# Patient Record
Sex: FEMALE | Race: WHITE
[De-identification: names, ages, dates, MRNs, and addresses within clinical notes are randomized per-mention and may not be internally consistent; named-entity substitution may affect disease eponyms.]

---

## 2018-12-14 ENCOUNTER — HOSPITAL ENCOUNTER (OUTPATIENT)
Dept: HOSPITAL 76 - DI | Age: 58
Discharge: HOME | End: 2018-12-14
Attending: FAMILY MEDICINE
Payer: MEDICARE

## 2018-12-14 DIAGNOSIS — R06.02: Primary | ICD-10-CM

## 2018-12-14 DIAGNOSIS — R06.00: ICD-10-CM

## 2018-12-14 DIAGNOSIS — R05: ICD-10-CM

## 2018-12-14 PROCEDURE — 71046 X-RAY EXAM CHEST 2 VIEWS: CPT

## 2018-12-18 NOTE — XRAY REPORT
Reason:  SOB/COLEMAN/COUGH X>1 MONTH

Procedure Date:  12/14/2018   

Accession Number:  [id number] / [id number]                    

Procedure:  XR  - Chest 2 View X-Ray CPT Code:  50584

 

FULL RESULT:

 

 

EXAM:

CHEST RADIOGRAPHY

 

EXAM DATE: 12/14/2018 12:24 PM.

 

CLINICAL HISTORY: Shortness of breath, dyspnea on exertion, and cough x1 

month.

 

COMPARISON: None.

 

TECHNIQUE: 2 views.

 

FINDINGS:

Lungs/Pleura: No focal opacities evident. No pleural effusion. No 

pneumothorax. Normal volumes.

 

Mediastinum: Heart and mediastinal contours are unremarkable.

 

Other: None.

IMPRESSION: Normal 2-view chest radiography.

 

[facility]

## 2021-03-22 ENCOUNTER — HOSPITAL ENCOUNTER (OUTPATIENT)
Dept: HOSPITAL 76 - DI | Age: 61
Discharge: HOME | End: 2021-03-22
Attending: FAMILY MEDICINE
Payer: MEDICARE

## 2021-03-22 DIAGNOSIS — R79.89: ICD-10-CM

## 2021-03-22 DIAGNOSIS — N18.9: Primary | ICD-10-CM

## 2021-03-22 NOTE — ULTRASOUND REPORT
PROCEDURE:  Retroperitoneal

 

INDICATIONS:  New chronic kidney disease; elevated adrenal hormones

 

TECHNIQUE:  

Real-time scanning was performed of the retroperitoneal organs, with image documentation.  

 

COMPARISON:  None.

 

FINDINGS:     

 

Both kidneys are normal in size, measuring approximately 10.6 cm in long axis. Cortical thickness is 
preserved bilaterally. Cortical echogenicity is subjectively heterogeneously and mildly increased raul
aterally. No focal mass. No shadowing calculus or hydronephrosis. The urinary bladder is decompressed
 and not well evaluated but grossly unremarkable.

 

IMPRESSION:  

Questionably increased renal parenchymal echogenicity. This could indicate chronic medical renal dise
ase in the appropriate clinical setting.

 

No findings of renal atrophy or hydronephrosis.

 

The adrenal glands are not visualized.

 

Reviewed by: Adalid Rodrigez MD on 3/22/2021 11:28 AM PDT

Approved by: Adalid Rodrigez MD on 3/22/2021 11:28 AM PDT

 

 

Station ID:  [id number]

## 2021-04-20 ENCOUNTER — HOSPITAL ENCOUNTER (OUTPATIENT)
Dept: HOSPITAL 76 - DI | Age: 61
Discharge: HOME | End: 2021-04-20
Attending: FAMILY MEDICINE
Payer: MEDICARE

## 2021-04-20 DIAGNOSIS — R07.81: Primary | ICD-10-CM

## 2021-04-21 NOTE — XRAY REPORT
PROCEDURE:  Chest 2 View X-Ray

 

INDICATIONS:  PLEURITIC CP, RIB TTP, R CHEST WALL

 

TECHNIQUE:  2 view(s) of the chest.  

 

COMPARISON:  None.

 

FINDINGS:  

 

Surgical changes and devices:  None.  

 

Lungs and pleura:  No pleural effusions or pneumothorax.  Lungs are clear.  

 

Mediastinum:  Mediastinal contours are normal.  Heart size is normal.  

 

Bones and chest wall:  No suspicious bony abnormalities.  Soft tissues appear unremarkable.  

 

IMPRESSION:  Two-view chest, no acute disease, source of pain is not seen.

 

Reviewed by: Ray Herzog MD on 4/21/2021 4:49 PM PDT

Approved by: Ray Herzog MD on 4/21/2021 4:49 PM PDT

 

 

Station ID:  [id number]

## 2022-03-23 ENCOUNTER — HOSPITAL ENCOUNTER (OUTPATIENT)
Dept: HOSPITAL 76 - DI.S | Age: 62
Discharge: HOME | End: 2022-03-23
Attending: FAMILY MEDICINE
Payer: MEDICARE

## 2022-03-23 DIAGNOSIS — Z12.31: Primary | ICD-10-CM

## 2022-03-23 DIAGNOSIS — Z80.3: ICD-10-CM

## 2022-03-28 ENCOUNTER — HOSPITAL ENCOUNTER (OUTPATIENT)
Dept: HOSPITAL 76 - DI | Age: 62
Discharge: HOME | End: 2022-03-28
Attending: FAMILY MEDICINE
Payer: MEDICARE

## 2022-03-28 DIAGNOSIS — M85.89: Primary | ICD-10-CM

## 2022-03-28 DIAGNOSIS — Z78.0: ICD-10-CM

## 2022-03-28 NOTE — DEXA REPORT
PROCEDURE: Dexa Spine and/or Hip

 

INDICATIONS: POSTMENOPAUSAL

 

TECHNIQUE: Dual energy x-ray absorptiometry (DXA) was performed on a Digium System.  Regions measur
ed are the AP Spine, femoral neck, and if needed forearm.

 

COMPARISON: None.

  

FINDINGS:

 

Lumbar Spine: 

Bone Mineral Density   0.979 g/cm/cm,T score  -1.7, osteopenia.

 

Left Hip:

Bone Mineral Density  0.808 g/cm/cm,T score -1.6, osteopenia.

 

Left Femoral Neck:

Bone Mineral Density  0.779 g/cm/cm, T score -1.9, osteopenia.

 

(T score greater or equal to -1.0: NORMAL)

(T score from -1.1 to -2.4: OSTEOPENIA)

(T score less than or equal to -2.5 to: OSTEOPOROSIS)

 

Impression: 

Decreased bone mineral density within the osteopenic range.

 

Patients with diagnosis of osteoporosis or osteopenia should have regular bone mineral density assess
ment.  For those eligible for Medicare, routine testing is allowed once every 2 years.  Testing frequ
ency can be increased for patients who have rapidly progressing disease or for those who are receivin
g medical therapy to restore bone mass.

 

Reviewed by: Zaid Harrell MD on 3/28/2022 3:54 PM PDT

Approved by: Zaid Harrell MD on 3/28/2022 3:54 PM PDT

 

 

Station ID:  [id number]

## 2022-03-30 NOTE — MAMMOGRAPHY REPORT
BILATERAL DIGITAL SCREENING MAMMOGRAM 3D/2D WITH EXAGGERATED CC: 3/23/2022

 

CLINICAL: Routine screening. Family history of breast cancer.  

 

No prior exams were available for comparison.  The tissue of both breasts is heterogeneously dense. T
his may lower the sensitivity of mammography.  

 

 

No significant masses, calcifications, or other findings are seen in either breast.  

 

IMPRESSION: NEGATIVE

There is no mammographic evidence of malignancy. A 1 year screening mammogram is recommended.  

 

 

This exam was interpreted at Station ID: [id number].  

 

NOTE: For mammograms, a report in lay terms will be sent to the patient. Approximately 15% of breast 
malignancies will not be visualized mammographically. In the management of a palpable breast mass, a 
negative mammogram must not discourage biopsy of a clinically suspicious lesion.

 

Electronically Signed By: Aris Meza M.D.          

[facility] [place]:3/29/2022 14:48:05  

 

 

 

ACR BI-RADS Category 1: Negative 3341F

PARENCHYMAL PATTERN: (D) - The breast(s) demonstrate(s) heterogeneously dense fibroglandular taylor montenegro.

BI-RADS CATEGORY: (1) - 1

RECOMMENDATION: (ANNUAL)  - Recommend routine annual screening mammography.

[id number]

1 year screening

LATERALITY: (B)

## 2024-01-19 ENCOUNTER — HOSPITAL ENCOUNTER (EMERGENCY)
Dept: HOSPITAL 76 - ED | Age: 64
Discharge: HOME | End: 2024-01-19
Payer: MEDICARE

## 2024-01-19 VITALS — OXYGEN SATURATION: 100 % | SYSTOLIC BLOOD PRESSURE: 122 MMHG | DIASTOLIC BLOOD PRESSURE: 80 MMHG

## 2024-01-19 DIAGNOSIS — H53.8: Primary | ICD-10-CM

## 2024-01-19 LAB
ANION GAP SERPL CALCULATED.4IONS-SCNC: 8 MMOL/L (ref 6–13)
BASOPHILS NFR BLD AUTO: 0.1 10^3/UL (ref 0–0.1)
BASOPHILS NFR BLD AUTO: 0.6 %
BUN SERPL-MCNC: 20 MG/DL (ref 6–20)
CALCIUM UR-MCNC: 9.5 MG/DL (ref 8.5–10.3)
CHLORIDE SERPL-SCNC: 100 MMOL/L (ref 101–111)
CO2 SERPL-SCNC: 27 MMOL/L (ref 21–32)
CREAT SERPLBLD-SCNC: 0.9 MG/DL (ref 0.6–1.3)
EOSINOPHIL # BLD AUTO: 0.2 10^3/UL (ref 0–0.7)
EOSINOPHIL NFR BLD AUTO: 2 %
ERYTHROCYTE [DISTWIDTH] IN BLOOD BY AUTOMATED COUNT: 12.9 % (ref 12–15)
GFRSERPLBLD MDRD-ARVRAT: 63 ML/MIN/{1.73_M2} (ref 89–?)
GLUCOSE SERPL-MCNC: 96 MG/DL (ref 74–104)
HCT VFR BLD AUTO: 37.3 % (ref 37–47)
HGB UR QL STRIP: 12.5 G/DL (ref 12–16)
LYMPHOCYTES # SPEC AUTO: 2.5 10^3/UL (ref 1.5–3.5)
LYMPHOCYTES NFR BLD AUTO: 31.9 %
MCH RBC QN AUTO: 32.2 PG (ref 27–31)
MCHC RBC AUTO-ENTMCNC: 33.5 G/DL (ref 32–36)
MCV RBC AUTO: 96.1 FL (ref 81–99)
MONOCYTES # BLD AUTO: 0.6 10^3/UL (ref 0–1)
MONOCYTES NFR BLD AUTO: 7.6 %
NEUTROPHILS # BLD AUTO: 4.5 10^3/UL (ref 1.5–6.6)
NEUTROPHILS # SNV AUTO: 7.8 X10^3/UL (ref 4.8–10.8)
NEUTROPHILS NFR BLD AUTO: 57.6 %
NRBC # BLD AUTO: 0 /100WBC
NRBC # BLD AUTO: 0 X10^3/UL
PDW BLD AUTO: 9.5 FL (ref 7.9–10.8)
PLATELET # BLD: 320 10^3/UL (ref 130–450)
POTASSIUM SERPL-SCNC: 3.8 MMOL/L (ref 3.5–4.5)
RBC MAR: 3.88 10^6/UL (ref 4.2–5.4)
SODIUM SERPLBLD-SCNC: 135 MMOL/L (ref 135–145)

## 2024-01-19 PROCEDURE — 36415 COLL VENOUS BLD VENIPUNCTURE: CPT

## 2024-01-19 PROCEDURE — 70480 CT ORBIT/EAR/FOSSA W/O DYE: CPT

## 2024-01-19 PROCEDURE — 85025 COMPLETE CBC W/AUTO DIFF WBC: CPT

## 2024-01-19 PROCEDURE — 80048 BASIC METABOLIC PNL TOTAL CA: CPT

## 2024-01-19 PROCEDURE — 99283 EMERGENCY DEPT VISIT LOW MDM: CPT

## 2024-01-19 PROCEDURE — 70450 CT HEAD/BRAIN W/O DYE: CPT

## 2024-01-19 PROCEDURE — 70543 MRI ORBT/FAC/NCK W/O &W/DYE: CPT

## 2024-01-19 PROCEDURE — 99284 EMERGENCY DEPT VISIT MOD MDM: CPT

## 2024-01-19 NOTE — MRI REPORT
PROCEDURE:  ORBITS/FACE W/WO

 

INDICATIONS:  papilledema R eye

 

CONTRAST: CLARISCAN 12.6 ML 

 

TECHNIQUE:  

Noncontrast sagittal T1 spin echo, axial FLAIR, axial gradient echo, axial diffusion and ADC acquired
 through the brain.  Coronal STIR, thin-slice axial T1 spin echo through the orbits.  After the admin
istration of contrast, thin slice axial and coronal T1 spin echo with fat saturation through the orbi
ts, axial T1 spin echo with fat saturation through the brain.  

 

COMPARISON:  None.

 

FINDINGS:  

Image quality:  Excellent.  

 

Orbits:  Globes are symmetrical.  The optic nerves are normal in size, without abnormal signal or enh
ancement.  No retrobulbar masses or fat abnormalities.  The extra-ocular muscles are normal and symme
tric in appearance.  Lacrimal glands are normal.  Optic chiasm is normal.  Periorbital soft tissues a
ppear normal.  

 

CSF spaces:  Ventricles are normal in size and shape.  Basal cisterns are patent.  No extra-axial flu
id collections.  

 

Brain:  No intracranial bleeds or mass effects.  No abnormal intracranial enhancement.  Gray-white ma
tter interface is intact.  Diffusion weighted images demonstrate no acute ischemic insults.  Pituitar
y gland appears normal, without sellar or suprasellar masses.  Brainstem appears normal.  Normal intr
avascular flow voids are present.  

 

Skull and face:  Calvarial marrow is normal in signal.  

 

Sinuses:  Sinuses and mastoids are clear.  

 

IMPRESSION:  

Normal MRI of the orbits.

 

No acute intracranial process. 

 

Reviewed by: Rashawn Lay MD on 1/19/2024 7:30 PM PST

Approved by: Rashawn Lay MD on 1/19/2024 7:30 PM PST

 

 

Station ID:  [id number]

## 2024-01-19 NOTE — ED PHYSICIAN DOCUMENTATION
PD HPI OPHTHO





- Stated complaint


Stated Complaint: RT EYE INJ





- Chief complaint


Chief Complaint: Heent





- History obtained from


History obtained from: Patient





- History of Present Illness


Timing - onset: How many days ago (2)


Timing - duration: Days (2)


Timing - details: Gradual onset


Pain level max: 0


Pain level now: 0


Location: Right


Associated symptoms: No: Swelling, Tearing, Discharge, Matting, FB sensation, 

Photophobia, Double vision, Headache





- Additional information


Additional information: 





Patient is a 63-year-old female sent in by her ophthalmologist.  She states that

she was told she has papilledema of the right eye.  She has had blurred vision 

for the past 2 days.  She states it is like the bottom half of her vision is 

smeared with Vaseline.  The top of the vision in the right eye is normal.  She 

saw an ophthalmologist today who sent her here for a CT/MRI of the orbits.  No 

falls.  No trauma.  No headaches.  She states that she did have some redness 

around the right periorbital area a few days ago, was started on Polytrim 

ophthalmic.  She was also started on Keflex.  The redness around the eye has 

resolved.  There is no redness of the sclera.  Had an otherwise normal 

ophthalmological exam today.





Review of Systems


Constitutional: denies: Fever, Chills


GI: denies: Vomiting, Diarrhea


Skin: denies: Rash


Musculoskeletal: denies: Neck pain, Back pain


Neurologic: denies: Focal weakness, Numbness, Confused, Headache, Head injury, 

LOC





PD PAST MEDICAL HISTORY





- Past Medical History


Past Medical History: Yes


Psych: Depression, Anxiety, Post traumatic stress disorder





- Past Surgical History


Past Surgical History: Yes


General: Appendectomy


/GYN: Tubal ligation





- Present Medications


Home Medications: 


                                Ambulatory Orders











 Medication  Instructions  Recorded  Confirmed


 


Polymyxin B/Trimeth Ophth Drop 1 drops EACHEYE DAILY 01/19/24 01/19/24





[Polytrim Ophth Drops]   














- Allergies


Allergies/Adverse Reactions: 


                                    Allergies











Allergy/AdvReac Type Severity Reaction Status Date / Time


 


chloramphenicol Allergy  Unknown Verified 01/19/24 16:51














- Social History


Does the pt smoke?: No


Smoking Status: Never smoker





PD ED PE NORMAL





- Vitals


Vital signs reviewed: Yes





- General


General: Alert and oriented X 3, No acute distress





- HEENT


HEENT: PERRL, EOMI, Moist mucous membranes, Other (No pain with extraocular 

movement.  Normal periorbital exam)





- Neck


Neck: Supple, no meningeal sign





- Cardiac


Cardiac: RRR, Strong equal pulses





- Respiratory


Respiratory: No respiratory distress, Clear bilaterally





- Abdomen


Abdomen: Soft, Non tender, Non distended





- Derm


Derm: Warm and dry





- Neuro


Neuro: Alert and oriented X 3, CNs 2-12 intact, No motor deficit, No sensory 

deficit, Normal speech


Eye Opening: Spontaneous


Motor: Obeys Commands


Verbal: Oriented


GCS Score: 15





- Psych


Psych: Normal mood, Normal affect





Results





- Vitals


Vitals: 


                               Vital Signs - 24 hr











  01/19/24 01/19/24





  16:46 20:20


 


Temperature 36.7 C 36.7 C


 


Heart Rate 78 72


 


Respiratory 18 18





Rate  


 


Blood Pressure 124/90 H 122/80


 


O2 Saturation 99 100








                                     Oxygen











O2 Source                      Room air

















- Labs


Labs: 


                                Laboratory Tests











  01/19/24 01/19/24





  18:31 18:31


 


WBC  7.8 


 


RBC  3.88 L 


 


Hgb  12.5 


 


Hct  37.3 


 


MCV  96.1 


 


MCH  32.2 H 


 


MCHC  33.5 


 


RDW  12.9 


 


Plt Count  320 


 


MPV  9.5 


 


Neut # (Auto)  4.5 


 


Lymph # (Auto)  2.5 


 


Mono # (Auto)  0.6 


 


Eos # (Auto)  0.2 


 


Baso # (Auto)  0.1 


 


Absolute Nucleated RBC  0.00 


 


Nucleated RBC %  0.0 


 


Sodium   135


 


Potassium   3.8


 


Chloride   100 L


 


Carbon Dioxide   27


 


Anion Gap   8.0


 


BUN   20


 


Creatinine   0.9


 


Estimated GFR (MDRD)   63 L


 


Glucose   96


 


Calcium   9.5














- Rads (name of study)


  ** orbit CT


Relevant Findings:: Final report received, See rad report





  ** orbit MRI


Relevant Findings:: Final report received, See rad report





  ** head CT


Relevant Findings:: Final report received, See rad report





PD Medical Decision Making





- ED course


Complexity details: reviewed results, re-evaluated patient, considered 

differential, d/w patient


ED course: 





63-year-old female was sent here for MRI of the orbits and CT of the orbits due 

to findings of possible papilledema on outpatient examination today with 

ophthalmology.  No report was given by the ophthalmologist.  Patient states that

she was supposed to get the testing and then follow-up with him next week. No 

acute findings on CT scan of the head or orbits.  No acute findings on MRI of 

the orbits and head.  No evidence of inflammation or edema.  No evidence of 

tumors or masses.  Ventricles are normal in size.  She has an appointment to 

follow-up with her ophthalmologist regarding the results of the studies.  Normal

neurological exam.  Normal gait.  No focal neurological deficits.  Patient 

counseled regarding signs and symptoms for which I believe and urgent re-

evaluation would be necessary. Patient with good understanding of and agreement 

to plan and is comfortable going home at this time





This document was made in part using voice recognition software. While efforts 

are made to proofread this document, sound alike and grammatical errors may 

occur.





Departure





- Departure


Disposition: 01 Home, Self Care


Clinical Impression: 


 Vision changes





Condition: Good


Instructions:  Vision Probs


Follow-Up: 


NOLAN STEPHENS JR, MD [Physician No Access] - 


Comments: 


Your CT scan does not show any acute abnormalities today.  Your orbit MRI with 

and without contrast does not show any abnormalities either.  Please follow-up 

with your ophthalmologist on Tuesday as scheduled.  Please return if you worsen.








PROCEDURE: ORBITS/FACE W/WO 





INDICATIONS: papilledema R eye 





CONTRAST: CLARISCAN 12.6 ML 





TECHNIQUE: 


Noncontrast sagittal T1 spin echo, axial FLAIR, axial gradient echo, axial 

diffusion and ADC 


acquired through the brain. Coronal STIR, thin-slice axial T1 spin echo through 

the orbits. After 


the administration of contrast, thin slice axial and coronal T1 spin echo with 

fat saturation 


through the orbits, axial T1 spin echo with fat saturation through the brain. 





COMPARISON: None. 





FINDINGS: 


Image quality: Excellent. 





Orbits: Globes are symmetrical. The optic nerves are normal in size, without 

abnormal signal or 


enhancement. No retrobulbar masses or fat abnormalities. The extra-ocular 

muscles are normal and 


symmetric in appearance. Lacrimal glands are normal. Optic chiasm is normal. 

Periorbital soft 


tissues appear normal. 





CSF spaces: Ventricles are normal in size and shape. Basal cisterns are patent. 

No extra-axial 


fluid collections. 





Brain: No intracranial bleeds or mass effects. No abnormal intracranial 

enhancement. Gray-white 


matter interface is intact. Diffusion weighted images demonstrate no acute 

ischemic insults. 


Pituitary gland appears normal, without sellar or suprasellar masses. Brainstem 

appears normal. 


Normal intravascular flow voids are present. 





Skull and face: Calvarial marrow is normal in signal. 





Sinuses: Sinuses and mastoids are clear. 





IMPRESSION: 


Normal MRI of the orbits. 





No acute intracranial process. 





Reviewed by: Rashawn Lay MD on 1/19/2024 7:30 PM PST 


Approved by: Rashawn Lay MD on 1/19/2024 7:30 PM PST 








Station ID: [id number] 














Report Electronically Signed by Rashawn Lay MD 01/19/24 1928 01/19/24 1930 





cc: Rita Mckee MD; Kuldeep Wyman MD 





*****ADDENDUM***** 





******** ORIGINAL REPORT ******** 








PROCEDURE: ORBITS/FACE W/WO 





INDICATIONS: papilledema R eye 





CONTRAST: CLARISCAN 12.6 ML 





TECHNIQUE: 


Noncontrast sagittal T1 spin echo, axial FLAIR, axial gradient echo, axial 

diffusion and ADC 


acquired through the brain. Coronal STIR, thin-slice axial T1 spin echo through 

the orbits. After 


the administration of contrast, thin slice axial and coronal T1 spin echo with 

fat saturation 


through the orbits, axial T1 spin echo with fat saturation through the brain. 





COMPARISON: None. 





FINDINGS: 


Image quality: Excellent. 





Orbits: Globes are symmetrical. The optic nerves are normal in size, without 

abnormal signal or 


enhancement. No retrobulbar masses or fat abnormalities. The extra-ocular 

muscles are normal and 


symmetric in appearance. Lacrimal glands are normal. Optic chiasm is normal. 

Periorbital soft 


tissues appear normal. 





CSF spaces: Ventricles are normal in size and shape. Basal cisterns are patent. 

No extra-axial 


fluid collections. 





Brain: No intracranial bleeds or mass effects. No abnormal intracranial 

enhancement. Gray-white 


matter interface is intact. Diffusion weighted images demonstrate no acute 

ischemic insults. 


Pituitary gland appears normal, without sellar or suprasellar masses. Brainstem 

appears normal. 


Normal intravascular flow voids are present. 





Skull and face: Calvarial marrow is normal in signal. 





Sinuses: Sinuses and mastoids are clear. 





IMPRESSION: 


Normal MRI of the orbits. 





No acute intracranial process. 





Reviewed by: Rashawn Lay MD on 1/19/2024 7:30 PM PST 


Approved by: Rashawn Lay MD on 1/19/2024 7:30 PM PST 





******** ADDENDUM #1 ******** 





Addendum: 





Additional images submitted for review. Findings and impression remain 

unchanged. Normal, symmetric 


enhancement of the optic nerve without enlargement. 





Reviewed by: Rashawn Lay MD on 1/19/2024 7:45 PM PST 


Approved by: Rashawn Lay MD on 1/19/2024 7:45 PM PST 








Station ID: [id number] 





Addendum : [facility] 


Addendum Reading Radiologist: Rashawn Lay MD 


Addendum Releasing Radiologist: Rashawn Lay MD 


Addendum Released Date Time: 01/19/24 1945 





Report Electronically Signed by Rashawn Lay MD at 01/19/24 1945 





PROCEDURE: Head WO 





INDICATIONS: R papilledema 





TECHNIQUE: 


Noncontrast 4.5 mm thick angled axial sections acquired from the foramen magnum 

to the vertex. For 


radiation dose reduction, the following was used: automated exposure control, 

adjustment of mA 


and/or kV according to patient size. 





COMPARISON: None. 





FINDINGS: 


Image quality: Excellent. 





CSF spaces: Basal cisterns are patent. No extra-axial fluid collections. 

Ventricles are normal in


size and shape. 





Brain: No midline shift. No intracranial masses or hemorrhage. Gray-white matter

interface is 


normal. 





Skull and face: Calvarium and visualized facial bones are intact, without 

suspicious lesions. 





Sinuses: Visualized sinuses and mastoids are clear. 








IMPRESSION: 


No acute intracranial pathology. 








PROCEDURE: Orbits WO 





INDICATIONS: R papilledema 





TECHNIQUE: 


Noncontrast 2.0 mm axial images acquired through the orbits. For radiation dose 

reduction, the 


following was used: automated exposure control, adjustment of mA and/or kV 

according to patient 


size. 








COMPARISON: None 





FINDINGS: 


Image quality: Excellent. 





Orbits: Globes are symmetrical. No metallic foreign bodies. The optic nerves are

normal in size. 


No retrobulbar masses or fat abnormalities. The extra-ocular muscles are normal 

and symmetrical in 


appearance. Lacrimal glands are normal in size. Optic chiasm is normal. 





Intracranial: Visualized portions of the cerebral hemispheres, brainstem, and 

spinal cord are 


normal. 





Bones and sinuses: Visualized calvarium and facial bones appear intact. 

Visualized sinuses and 


mastoids are clear. 





IMPRESSION: 


Normal CT of the orbits. 








Forms:  PCP List


Discharge Date/Time: 01/19/24 20:20

## 2024-01-19 NOTE — CT REPORT
PROCEDURE:  Orbits WO

 

INDICATIONS:  R papilledema

 

TECHNIQUE:  

Noncontrast 2.0 mm axial images acquired through the orbits.  For radiation dose reduction, the follo
wing was used:  automated exposure control, adjustment of mA and/or kV according to patient size.

 

 

COMPARISON:  None

 

FINDINGS:  

Image quality:  Excellent.  

 

Orbits:  Globes are symmetrical.  No metallic foreign bodies.  The optic nerves are normal in size.  
No retrobulbar masses or fat abnormalities.  The extra-ocular muscles are normal and symmetrical in a
ppearance.  Lacrimal glands are normal in size.  Optic chiasm is normal.  

 

Intracranial:  Visualized portions of the cerebral hemispheres, brainstem, and spinal cord are normal
.  

 

Bones and sinuses:  Visualized calvarium and facial bones appear intact.  Visualized sinuses and mast
oids are clear.  

 

IMPRESSION:  

Normal CT of the orbits.

 

 

 

Reviewed by: Rashawn Lay MD on 1/19/2024 6:40 PM PST

Approved by: Rashawn Lay MD on 1/19/2024 6:40 PM [facility]

 

 

Station ID:  [id number]

## 2024-01-19 NOTE — CT REPORT
PROCEDURE:  Head WO

 

INDICATIONS:  R papilledema

 

TECHNIQUE:  

Noncontrast 4.5 mm thick angled axial sections acquired from the foramen magnum to the vertex.  For r
adiation dose reduction, the following was used:  automated exposure control, adjustment of mA and/or
 kV according to patient size.

 

COMPARISON:  None.

 

FINDINGS:  

Image quality:  Excellent.  

 

CSF spaces:  Basal cisterns are patent.  No extra-axial fluid collections.  Ventricles are normal in 
size and shape.  

 

Brain:  No midline shift.  No intracranial masses or hemorrhage.  Gray-white matter interface is norm
al.  

 

Skull and face:  Calvarium and visualized facial bones are intact, without suspicious lesions.  

 

Sinuses:  Visualized sinuses and mastoids are clear.  

 

 

IMPRESSION:  

No acute intracranial pathology.

 

 

 

Reviewed by: Rashawn Lay MD on 1/19/2024 6:39 PM PST

Approved by: Rashawn Lay MD on 1/19/2024 6:39 PM [facility]

 

 

Station ID:  [id number]

## 2024-01-23 ENCOUNTER — HOSPITAL ENCOUNTER (EMERGENCY)
Dept: HOSPITAL 76 - ED | Age: 64
Discharge: HOME | End: 2024-01-23
Payer: MEDICARE

## 2024-01-23 VITALS — OXYGEN SATURATION: 100 % | DIASTOLIC BLOOD PRESSURE: 79 MMHG | SYSTOLIC BLOOD PRESSURE: 164 MMHG

## 2024-01-23 DIAGNOSIS — H53.9: Primary | ICD-10-CM

## 2024-01-23 PROCEDURE — 99283 EMERGENCY DEPT VISIT LOW MDM: CPT

## 2024-01-23 PROCEDURE — 86140 C-REACTIVE PROTEIN: CPT

## 2024-01-23 PROCEDURE — 85651 RBC SED RATE NONAUTOMATED: CPT

## 2024-01-23 PROCEDURE — 36415 COLL VENOUS BLD VENIPUNCTURE: CPT

## 2024-01-23 NOTE — ED PHYSICIAN DOCUMENTATION
History of Present Illness





- Stated complaint


Stated Complaint: RT EYE VISION ISSUE





- Chief complaint


Chief Complaint: Heent





- History obtained from


History obtained from: Patient





- History of Present Illness


Pain level max: 0


Pain level now: 0





- Additonal information


Additional information: 





63-year-old female with vision changes for the past 2 weeks.  She was seen here 

last week with an MRI and CT scan of her head and orbits.  Saw her 

ophthalmologist again today who sent her back for a sed rate and CRP.  Patient 

has no other acute issues.  No headache.  No fevers.  No chills.





Review of Systems


Constitutional: denies: Fever, Chills


GI: denies: Vomiting





PD PAST MEDICAL HISTORY





- Past Medical History


Past Medical History: Yes


Cardiovascular: None


Respiratory: None


Neuro: None


Endocrine/Autoimmune: None


GI: None


GYN: None


: None


HEENT: None


Psych: Depression, Anxiety, Post traumatic stress disorder


Musculoskeletal: None


Derm: None





- Past Surgical History


Past Surgical History: Yes


General: Appendectomy


/GYN: Tubal ligation





- Present Medications


Home Medications: 


                                Ambulatory Orders











 Medication  Instructions  Recorded  Confirmed


 


predniSONE [Deltasone] 30 mg PO DAILY 01/23/24 01/23/24














- Allergies


Allergies/Adverse Reactions: 


                                    Allergies











Allergy/AdvReac Type Severity Reaction Status Date / Time


 


chloramphenicol Allergy  Unknown Verified 01/23/24 18:13














- Social History


Does the pt smoke?: No


Smoking Status: Never smoker


Does the pt drink ETOH?: Yes


ETOH Use: Beer


Does the pt have substance abuse?: Yes


Substance Use and Type: Marijuana





- Immunizations


Immunizations are current?: Yes





- POLST


Patient has POLST: No





PD ED PE NORMAL





- Vitals


Vital signs reviewed: Yes





- General


General: Alert and oriented X 3, No acute distress





- HEENT


HEENT: PERRL, EOMI, Moist mucous membranes





- Neck


Neck: Supple, no meningeal sign





- Cardiac


Cardiac: RRR





- Respiratory


Respiratory: No respiratory distress, Clear bilaterally





- Derm


Derm: Warm and dry





- Neuro


Neuro: Alert and oriented X 3





Results





- Vitals


Vitals: 


                               Vital Signs - 24 hr











  01/23/24





  18:14


 


Temperature 36.3 C L


 


Heart Rate 90


 


Respiratory 16





Rate 


 


Blood Pressure 164/79 H


 


O2 Saturation 100








                                     Oxygen











O2 Source                      Room air

















- Labs


Labs: 


                                Laboratory Tests











  01/23/24 01/23/24





  18:32 18:32


 


ESR  15 


 


C-Reactive Protein   < 0.5














PD Medical Decision Making





- ED course


Complexity details: reviewed results, considered differential, d/w patient


ED course: 





Sed rate and CRP are both normal.  Patient is being worked up for the right-

sided papilledema by her ophthalmologist.  I did recommend to the patient that 

she may want to consider seeing a neuro-ophthalmologist as there are neurolog

ical conditions that can cause papilledema as well.  I gave the patient 

information for neuro-ophthalmologist in the area. I did attempt to call her 

ophthalmologist, but his office is closed and he has gone home for the day.  

There is no after-hours call number available. Patient is following up closely 

with her ophthalmologist.  Patient counseled regarding signs and symptoms for 

which I believe and urgent re-evaluation would be necessary. Patient with good 

understanding of and agreement to plan and is comfortable going home at this 

time





This document was made in part using voice recognition software. While efforts 

are made to proofread this document, sound alike and grammatical errors may 

occur.





Departure





- Departure


Disposition: 01 Home, Self Care


Clinical Impression: 


 Vision changes





Condition: Good


Instructions:  ED Blurred Vision


Follow-Up: 


NOLAN STEPHENS JR, MD [Physician No Access] - 


NOLAN STEPHENS III, MD [Physician No Access] - 


Comments: 


Your CRP is normal at less than 0.5 and your ESR or sed rate is also normal at 

15.  Please follow-up with your ophthalmologist as scheduled.  I have also 

included names and phone numbers of neuro-ophthalmologists.  











Neuro-Ophthalmology


[facility]


 [address], [address], [place] [address]


20.3 miles away


 [phone]


Fax: [phone]





 Department of Ophthalmology


[address] [place] [address]. [place] [address] (academic offices)


[facility] (mailing address)


[id number], [address] [place] [address]


Phone: [phone]


Fax: [phone]








Juana Burgess MD


[facility]


[address], [address]


[place] [address]





Main: [phone]


Fax: [phone]


Locate on Map


Forms:  PCP List


Discharge Date/Time: 01/23/24 19:20

## 2024-03-04 ENCOUNTER — HOSPITAL ENCOUNTER (OUTPATIENT)
Dept: HOSPITAL 76 - LAB | Age: 64
Discharge: HOME | End: 2024-03-04
Attending: SPECIALIST
Payer: MEDICARE

## 2024-03-04 DIAGNOSIS — H53.481: ICD-10-CM

## 2024-03-04 DIAGNOSIS — Z01.812: Primary | ICD-10-CM

## 2024-03-04 DIAGNOSIS — H54.7: ICD-10-CM

## 2024-03-04 DIAGNOSIS — H47.10: ICD-10-CM

## 2024-03-04 LAB
CREAT SERPLBLD-SCNC: 1 MG/DL (ref 0.6–1.3)
GFRSERPLBLD MDRD-ARVRAT: 56 ML/MIN/{1.73_M2} (ref 89–?)

## 2024-03-04 PROCEDURE — 70496 CT ANGIOGRAPHY HEAD: CPT

## 2024-03-04 PROCEDURE — 36415 COLL VENOUS BLD VENIPUNCTURE: CPT

## 2024-03-04 PROCEDURE — 70498 CT ANGIOGRAPHY NECK: CPT

## 2024-03-04 PROCEDURE — 82565 ASSAY OF CREATININE: CPT

## 2024-03-04 RX ADMIN — IOVERSOL ONE ML: 678 INJECTION INTRA-ARTERIAL; INTRAVENOUS at 18:33

## 2024-03-04 NOTE — CT REPORT
PROCEDURE:  Angio Head/Neck

 

INDICATIONS:  VISUAL PROBLEMS

 

TECHNIQUE:  

After the administration of intravenous contrast, 1 mm thick sections acquired from the aortic arch t
hrough the [place].  3-dimensional maximum-intensity-projection (MIP) and/or volume renderin
g reformats were acquired of the central intracranial vasculature and neck separately.  For radiation
 dose reduction, the following was used:  automated exposure control, adjustment of mA and/or kV acco
rding to patient size.

 

 

CONTRAST: Optiray 320- 80ml 

 

COMPARISON:  None. 

 

FINDINGS:  

Image quality: Limited by bolus timing, with venous contamination.  

 

HEAD CT:

CSF Spaces:  Basal cisterns are patent.  No extra-axial fluid collections.  Ventricles are normal in 
size and shape. 

 

Brain:  No significant abnormality is seen for scanning technique.  

 

Skull and face:  Calvarium and visualized facial bones appear intact, without suspicious lesions.  

 

Sinuses:  Visualized sinuses and mastoids are clear.  

 

HEAD CT ANGIOGRAPHY:  

Anterior circulation:  Intracranial internal carotid arteries are normal in size and flow.  The flow 
within the paired anterior cerebral arteries is normal and symmetric.  The flow within the middle cer
ebral arteries is normal and symmetric.  The anterior communicating artery is seen.  No aneurysms are
 seen.  

 

Posterior circulation:  Visualized portions of the vertebral arteries demonstrate normal caliber, and
 join to form a normal appearing basilar artery.  Flow within the posterior cerebral arteries is norm
al and symmetric.  No aneurysms are seen.  

 

NECK CT ANGIOGRAPHY:  

Carotid system:  The great vessels demonstrate a conventional anatomy as they arise from the aortic a
rch.  The origins of the common carotid arteries appear patent.  The common carotid arteries demonstr
ate normal caliber and courses.  The bifurcation regions are both widely patent.  The internal caroti
d arteries demonstrate normal calibers and courses.  

 

Posterior circulation:  The origins of the vertebral arteries both appear widely patent.  The more carpenter
perior extracranial portions of both vertebral arteries also demonstrate normal courses and calibers.
  They join to form a normal appearing basilar artery.  

 

Soft tissues:  Visualized neck soft tissues demonstrate no suspicious abnormalities.  

 

Bones:  No suspicious bony lesions.  Visualized cervical spine appears normally aligned.  Focal lower
 cervical spine degenerative change can be seen.

 

 

IMPRESSION:  

 

No significant intracranial arterial abnormality is seen.  

 

No significant abnormality is seen within the arteries of the neck.  

 

 

 

 

The estimate of stenosis included in the report of the imaging study was calculated using the NASCET 
method

 

Reviewed by: Prieto Lopez MD on 3/4/2024 6:44 PM [facility]

Approved by: Prieto Lopez MD on 3/4/2024 6:44 PM [facility]

 

 

Station ID:  [id number]

## 2024-03-05 ENCOUNTER — HOSPITAL ENCOUNTER (OUTPATIENT)
Dept: HOSPITAL 76 - DI.S | Age: 64
Discharge: HOME | End: 2024-03-05
Attending: FAMILY MEDICINE
Payer: MEDICARE

## 2024-03-05 DIAGNOSIS — Z12.31: Primary | ICD-10-CM

## 2024-03-05 DIAGNOSIS — R92.333: ICD-10-CM

## 2024-03-05 DIAGNOSIS — Z80.3: ICD-10-CM

## 2024-03-07 NOTE — MAMMOGRAPHY REPORT
BILATERAL DIGITAL SCREENING MAMMOGRAM 3D/2D: 3/5/2024

 

CLINICAL: Routine screening. Family history of breast cancer.  

 

Comparison is made to exam dated:  3/23/2022 mammogram - [facility].  

 

Both breasts are heterogeneously dense, which may obscure small masses (category c / 51-75% glandular
 tissue).  

 

No significant masses, calcifications, or other findings are seen in either breast.  

There has been no significant interval change.

 

IMPRESSION: NEGATIVE

There is no mammographic evidence of malignancy. A 1 year screening mammogram is recommended.  

 

Based on the Tyrer Cuzick model (a risk assessment model) the patient's lifetime risk is 10.3% and he
r 10 year risk is 4.6%. According to the ACR, ACS, and NCCN guidelines, an annual breast MRI exam yasmeen
ng with mammogram is recommended if the patient's lifetime risk is 20% or greater.

 

 

This exam was interpreted at Station ID: [id number].  

 

NOTE: For mammograms, a report in lay terms will be sent to the patient. Approximately 15% of breast 
malignancies will not be visualized mammographically. In the management of a palpable breast mass, a 
negative mammogram must not discourage biopsy of a clinically suspicious lesion.

 

Electronically Signed By: Karley Cardenas M.D., PH.D          

eb/penrad:3/6/2024 14:19:57  

 

 

letter sent: No_Letter  

ACR BI-RADS Category 1: Negative 3341F

PARENCHYMAL PATTERN: (D) - The breast(s) demonstrate(s) heterogeneously dense fibroglandular [place].

BI-RADS CATEGORY: (1) - 1

RECOMMENDATION: (ANNUAL)  - Recommend routine annual screening mammography.

[id number]

1 year screening

LATERALITY: (B)